# Patient Record
Sex: MALE | Race: BLACK OR AFRICAN AMERICAN | Employment: STUDENT | URBAN - METROPOLITAN AREA
[De-identification: names, ages, dates, MRNs, and addresses within clinical notes are randomized per-mention and may not be internally consistent; named-entity substitution may affect disease eponyms.]

---
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
## 2021-07-06 ENCOUNTER — OFFICE VISIT (OUTPATIENT)
Dept: FAMILY MEDICINE CLINIC | Facility: CLINIC | Age: 21
End: 2021-07-06
Payer: COMMERCIAL

## 2021-07-06 VITALS
DIASTOLIC BLOOD PRESSURE: 63 MMHG | HEIGHT: 67 IN | BODY MASS INDEX: 22.29 KG/M2 | SYSTOLIC BLOOD PRESSURE: 115 MMHG | WEIGHT: 142 LBS | HEART RATE: 72 BPM

## 2021-07-06 DIAGNOSIS — Z00.00 ROUTINE PHYSICAL EXAMINATION: Primary | ICD-10-CM

## 2021-07-06 PROCEDURE — 3078F DIAST BP <80 MM HG: CPT | Performed by: FAMILY MEDICINE

## 2021-07-06 PROCEDURE — 3008F BODY MASS INDEX DOCD: CPT | Performed by: FAMILY MEDICINE

## 2021-07-06 PROCEDURE — 3074F SYST BP LT 130 MM HG: CPT | Performed by: FAMILY MEDICINE

## 2021-07-06 PROCEDURE — 99385 PREV VISIT NEW AGE 18-39: CPT | Performed by: FAMILY MEDICINE

## 2021-07-06 NOTE — PROGRESS NOTES
HPI/Subjective:   Patient ID: Sunshine Miller is a 21year old male. Patient is here for routine physical exam. No acute issues. No significant chronic medical problems. Patient is requesting testing. Diet and exercise have been good.  Past medical h sounds: Normal breath sounds. Abdominal:      General: Abdomen is flat. There is no distension. Palpations: Abdomen is soft. Tenderness: There is no abdominal tenderness.    Genitourinary:     Penis: Normal.       Prostate: Normal.      Rectum:

## 2021-07-28 ENCOUNTER — TELEPHONE (OUTPATIENT)
Dept: FAMILY MEDICINE CLINIC | Facility: CLINIC | Age: 21
End: 2021-07-28

## 2021-07-28 DIAGNOSIS — R11.2 NON-INTRACTABLE VOMITING WITH NAUSEA, UNSPECIFIED VOMITING TYPE: Primary | ICD-10-CM

## 2021-07-28 NOTE — TELEPHONE ENCOUNTER
COVID order generated.  Please notify pt to call # to schedule COVID test. Follow up and further management after testing

## 2021-07-28 NOTE — TELEPHONE ENCOUNTER
Pt asking how he can get tested for Covid-19, pt denies any symptoms at this time. Pt states he was advised to have testing done by his employerr as he vomited once on Monday and needs to be tested before returning to work.  Pt stated he had one episode of

## 2021-07-29 NOTE — TELEPHONE ENCOUNTER
Advised patient of 's note.  Patient verbalized he is going to go to [facility] to get COVID test.

## 2024-04-23 ENCOUNTER — OFFICE VISIT (OUTPATIENT)
Dept: OCCUPATIONAL MEDICINE | Age: 24
End: 2024-04-23
Attending: FAMILY MEDICINE

## (undated) NOTE — ED AVS SNAPSHOT
Cam Meadows   MRN: [id number]    Department:  [facility] Emergency Department   Date of Visit:  9/16/2017           Disclosure     Insurance plans vary and the physician(s) referred by the ER may not be covered by your plan.  Please contact you CARE PHYSICIAN AT ONCE OR RETURN IMMEDIATELY TO THE EMERGENCY DEPARTMENT. If you have been prescribed any medication(s), please fill your prescription right away and begin taking the medication(s) as directed.   If you believe that any of the medications

## (undated) NOTE — LETTER
September 16, 2017    Patient: Steven Delvalle   Date of Visit: 9/16/2017       To Whom It May Concern:    Steven Delvalle was seen and treated in our emergency department on 9/16/2017.  He should not participate in gym/sports until 9/23/2017 or cleared by hi

## (undated) NOTE — LETTER
[address]   Date:   6/20/2019     Name:   Jan Snyder    YOB: 2000   MRN:   [id number]       WHERE IS YOUR PAIN NOW? Saad the areas on your body where you feel the described sensations.   Use the appropriate

## (undated) NOTE — LETTER
Patient Name: Ena Huynh  : 2000  MRN: [id number]  Patient Address: [place] [id number]      Coronavirus Disease 2019 (COVID-19)     [place] [address] is committed to the safety and well-being of our patients symptoms carefully. If your symptoms get worse, call your healthcare provider immediately. 3. Get rest and stay hydrated. 4. If you have a medical appointment, call the healthcare provider ahead of time and tell them that you have or may have COVID-19. without the use of fever-reducing medications; and  · Improvement in respiratory symptoms (e.g., cough, shortness of breath); and  · At least 10 days have passed since symptoms first appeared OR if asymptomatic patient or date of symptom onset is unclear t convalescent plasma donors must:    · Have had a confirmed diagnosis of COVID-19  · Be symptom-free for at least 14 days*    *Some people will be required to have a repeat COVID-19 test in order to be eligible to donate.  If you’re instructed by Kayla Horan bridger Post-COVID conditions to be random. Researchers are trying to identify similarities between people with a Post-COVID condition to better understand if there are risk factors. How do I prevent a Post-COVID condition?   The best way to prevent the long-t